# Patient Record
(demographics unavailable — no encounter records)

---

## 2025-06-18 NOTE — ASSESSMENT
[FreeTextEntry1] : throat eval: -3+ tonsils, no tonsil stones noted today -Spoke to him about the tonsillectomy procedure at length. All risks/benefits and postop instruction discussed. All questions answered -Booking form placed   LPRD: Laryngoscopy shows Postcricoid area without erythema or edema consistent with reflux. - reflux precaution handout given and reviewed with patient - omeprazole 20mg daily for one month -Recent Upper endoscopy shows hiatal hernia  -Continue to follow with GI  sinus pressure and nasal congestion -Nasal Endoscopy shows left DNS and hypertrophic turbinate's bilateral, no sinus infection noted - Rx: flonase on spray BID - f/u if sx persist after 4-6 weeks of treatment

## 2025-06-18 NOTE — PROCEDURE
[FreeTextEntry6] : Reason for nasal endoscopy: anterior rhinoscopy insufficient to account for symptoms.  Flexible scope #2 was used. Right nasal passage with hypertrophic inferior, normal middle and superior turbinates. Nasal passage patent with clear middle meatus and sphenoethmoid recess. Left dns. Left nasal passage with hypertrophic inferior, normal middle and superior turbinates. Nasal passage was patent with clear middle meatus and sphenoethmoid recess. No mucopurulence or polyps appreciated. Nasopharynx clear.  [de-identified] : Reason for laryngoscopy: Oral exam unable to account for symptoms.  Flexible scope #2 used. Passed through nasal passage and nasopharynx/oropharynx/hypopharynx clear. Full BOT but without mucosal abnormality. Supraglottis normal. Glottis with fully mobile vocal cords without lesions or masses. Visualized subglottis clear. Postcricoid area without erythema or edema consistent with reflux.  No pooling of secretions.

## 2025-06-18 NOTE — HISTORY OF PRESENT ILLNESS
[de-identified] : 41 y/o M seen by Dr. De, with c/o nasal congestion, throat clearing, and Globus.  Also notes hx of bell's palsy with residual lingual numbness.  Notes Lingual nerve was evlauated by neurologist and he was told he has some nerve damage related to his Bell's palsy with resolution of paralysis - he did have some synkinesis that was treated with botox and has since resolved. Reports he had imaging with MRI and was normal. He notes he still smokes about 1PPD and is concerned about malignancy and wants his throat checked. Also with sinus pressure and congestion at times. Currently not using any nasal sprays.  [FreeTextEntry1] : He is here for f/u for recurrent tonsil stones 3x/month. He denies recurrent tonsillitis or strep infections. He has been getting a lot throat discomfort and mucus builds up. He still continues to have nasal congestion and intermittent sinus pressure. He has not been using any nasal regimen. He has reflux. He had an upper endoscopy that showed a hiatal hernia. He never tried to omeprazole that was prescribed last visit.

## 2025-06-18 NOTE — END OF VISIT
[FreeTextEntry3] : I personally saw and examined Mr. ORION SPRAGUE in detail this visit today. I personally reviewed the HPI, PMH, FH, SH, ROS and medications/allergies. I have spoken to SHAWNA Lambert regarding the history and have personally determined the assessment and plan of care, and documented this myself. I was present and participated in all key portions of the encounter and all procedures noted above. I have made changes in the body of the note where appropriate.   Attesting Faculty: See Attending Signature Below

## 2025-06-18 NOTE — PHYSICAL EXAM
[Nasal Endoscopy Performed] : nasal endoscopy was performed, see procedure section for findings [] : septum deviated to the left [Normal] : mucosa is normal [Midline] : trachea located in midline position [Laryngoscopy Performed] : laryngoscopy was performed, see procedure section for findings [de-identified] : hypertrophic bialteral [de-identified] : 3+ bilateral